# Patient Record
Sex: FEMALE | Race: WHITE | NOT HISPANIC OR LATINO | Employment: UNEMPLOYED | RURAL
[De-identification: names, ages, dates, MRNs, and addresses within clinical notes are randomized per-mention and may not be internally consistent; named-entity substitution may affect disease eponyms.]

---

## 2021-03-22 ENCOUNTER — HOSPITAL ENCOUNTER (EMERGENCY)
Facility: HOSPITAL | Age: 21
Discharge: HOME | End: 2021-03-22
Payer: MEDICAID

## 2021-03-22 VITALS
HEIGHT: 69 IN | OXYGEN SATURATION: 99 % | SYSTOLIC BLOOD PRESSURE: 124 MMHG | TEMPERATURE: 98 F | BODY MASS INDEX: 23.4 KG/M2 | DIASTOLIC BLOOD PRESSURE: 74 MMHG | WEIGHT: 158 LBS | HEART RATE: 88 BPM | RESPIRATION RATE: 18 BRPM

## 2021-03-22 DIAGNOSIS — O21.0 HYPEREMESIS GRAVIDARUM: Primary | ICD-10-CM

## 2021-03-22 DIAGNOSIS — N39.0 URINARY TRACT INFECTION WITHOUT HEMATURIA, SITE UNSPECIFIED: ICD-10-CM

## 2021-03-22 LAB
ANION GAP SERPL CALCULATED.3IONS-SCNC: 16 MMOL/L
BASOPHILS # BLD AUTO: 0.05 X10E3/UL (ref 0–0.2)
BASOPHILS NFR BLD AUTO: 0.6 % (ref 0–1)
BILIRUB UR QL STRIP: ABNORMAL MG/DL
BUN SERPL-MCNC: 10 MG/DL (ref 7–18)
CALCIUM SERPL-MCNC: 9.6 MG/DL (ref 8.5–10.1)
CHLORIDE SERPL-SCNC: 102 MMOL/L (ref 98–107)
CLARITY UR: CLEAR
CO2 SERPL-SCNC: 23 MMOL/L (ref 21–32)
COLOR UR: YELLOW
CREAT SERPL-MCNC: 0.72 MG/DL (ref 0.55–1.02)
EOSINOPHIL # BLD AUTO: 0.02 X10E3/UL (ref 0–0.5)
EOSINOPHIL NFR BLD AUTO: 0.2 % (ref 1–4)
ERYTHROCYTE [DISTWIDTH] IN BLOOD BY AUTOMATED COUNT: 11.7 % (ref 11.5–14.5)
GLUCOSE SERPL-MCNC: 101 MG/DL (ref 74–106)
GLUCOSE UR STRIP-MCNC: NEGATIVE MG/DL
HCG SERPL-ACNC: NORMAL MIU/ML
HCT VFR BLD AUTO: 42.5 % (ref 38–47)
HGB BLD-MCNC: 15 G/DL (ref 12–16)
IMM GRANULOCYTES # BLD AUTO: 0.02 X10E3/UL (ref 0–0.04)
IMM GRANULOCYTES NFR BLD: 0.2 % (ref 0–0.4)
KETONES UR STRIP-SCNC: >=80 MG/DL
LEUKOCYTE ESTERASE UR QL STRIP: ABNORMAL LEU/UL
LYMPHOCYTES # BLD AUTO: 1.27 X10E3/UL (ref 1–4.8)
LYMPHOCYTES NFR BLD AUTO: 15.8 % (ref 27–41)
MCH RBC QN AUTO: 30.1 PG (ref 27–31)
MCHC RBC AUTO-ENTMCNC: 35.3 G/DL (ref 32–36)
MCV RBC AUTO: 85.2 FL (ref 80–96)
MONOCYTES # BLD AUTO: 0.33 X10E3/UL (ref 0–0.8)
MONOCYTES NFR BLD AUTO: 4.1 % (ref 2–6)
MPC BLD CALC-MCNC: 9.8 FL (ref 9.4–12.4)
NEUTROPHILS # BLD AUTO: 6.33 X10E3/UL (ref 1.8–7.7)
NEUTROPHILS NFR BLD AUTO: 79.1 % (ref 53–65)
NITRITE UR QL STRIP: NEGATIVE
NRBC # BLD AUTO: 0 X10E3/UL (ref 0–0)
NRBC, AUTO (.00): 0 /100 (ref 0–0)
PH UR STRIP: 6 PH UNITS (ref 5–8)
PLATELET # BLD AUTO: 244 X10E3/UL (ref 150–400)
POTASSIUM SERPL-SCNC: 3.9 MMOL/L (ref 3.5–5.1)
PROT UR QL STRIP: ABNORMAL MG/DL
RBC # BLD AUTO: 4.99 X10E6/UL (ref 4.2–5.4)
RBC # UR STRIP: NEGATIVE ERY/UL
SODIUM SERPL-SCNC: 137 MMOL/L (ref 136–145)
SP GR UR STRIP: 1.02 (ref 1–1.03)
UROBILINOGEN UR STRIP-ACNC: 1 EU/DL
WBC # BLD AUTO: 8.02 X10E3/UL (ref 4.5–11)

## 2021-03-22 PROCEDURE — 96365 THER/PROPH/DIAG IV INF INIT: CPT

## 2021-03-22 PROCEDURE — 63600175 PHARM REV CODE 636 W HCPCS: Performed by: NURSE PRACTITIONER

## 2021-03-22 PROCEDURE — 81003 URINALYSIS AUTO W/O SCOPE: CPT

## 2021-03-22 PROCEDURE — 25000003 PHARM REV CODE 250: Performed by: NURSE PRACTITIONER

## 2021-03-22 PROCEDURE — 96361 HYDRATE IV INFUSION ADD-ON: CPT

## 2021-03-22 PROCEDURE — 99283 EMERGENCY DEPT VISIT LOW MDM: CPT | Mod: ,,, | Performed by: NURSE PRACTITIONER

## 2021-03-22 PROCEDURE — 36415 COLL VENOUS BLD VENIPUNCTURE: CPT

## 2021-03-22 PROCEDURE — 85025 COMPLETE CBC W/AUTO DIFF WBC: CPT

## 2021-03-22 PROCEDURE — 80048 BASIC METABOLIC PNL TOTAL CA: CPT

## 2021-03-22 PROCEDURE — 99283 PR EMERGENCY DEPT VISIT,LEVEL III: ICD-10-PCS | Mod: ,,, | Performed by: NURSE PRACTITIONER

## 2021-03-22 PROCEDURE — 99284 EMERGENCY DEPT VISIT MOD MDM: CPT | Mod: 25

## 2021-03-22 RX ORDER — FLUVOXAMINE MALEATE 150 MG/1
CAPSULE, EXTENDED RELEASE ORAL
COMMUNITY
End: 2022-03-18 | Stop reason: ALTCHOICE

## 2021-03-22 RX ORDER — ONDANSETRON 4 MG/1
4 TABLET, FILM COATED ORAL EVERY 8 HOURS PRN
Qty: 12 TABLET | Refills: 0 | OUTPATIENT
Start: 2021-03-22 | End: 2021-05-08

## 2021-03-22 RX ORDER — NITROFURANTOIN 25; 75 MG/1; MG/1
100 CAPSULE ORAL 2 TIMES DAILY
Qty: 10 CAPSULE | Refills: 0 | Status: SHIPPED | OUTPATIENT
Start: 2021-03-22 | End: 2021-03-22

## 2021-03-22 RX ADMIN — SODIUM CHLORIDE 1000 ML: 9 INJECTION, SOLUTION INTRAVENOUS at 01:03

## 2021-03-22 RX ADMIN — PROMETHAZINE HYDROCHLORIDE 12.5 MG: 25 INJECTION INTRAMUSCULAR; INTRAVENOUS at 01:03

## 2021-05-07 ENCOUNTER — HOSPITAL ENCOUNTER (EMERGENCY)
Facility: HOSPITAL | Age: 21
Discharge: HOME | End: 2021-05-08
Attending: STUDENT IN AN ORGANIZED HEALTH CARE EDUCATION/TRAINING PROGRAM
Payer: MEDICAID

## 2021-05-07 VITALS
DIASTOLIC BLOOD PRESSURE: 69 MMHG | HEIGHT: 68 IN | HEART RATE: 90 BPM | TEMPERATURE: 98 F | WEIGHT: 149 LBS | BODY MASS INDEX: 22.58 KG/M2 | OXYGEN SATURATION: 100 % | SYSTOLIC BLOOD PRESSURE: 108 MMHG | RESPIRATION RATE: 16 BRPM

## 2021-05-07 DIAGNOSIS — R11.10 VOMITING, INTRACTABILITY OF VOMITING NOT SPECIFIED, PRESENCE OF NAUSEA NOT SPECIFIED, UNSPECIFIED VOMITING TYPE: Primary | ICD-10-CM

## 2021-05-07 LAB
ANION GAP SERPL CALCULATED.3IONS-SCNC: 16 MMOL/L (ref 7–16)
BUN SERPL-MCNC: 6 MG/DL (ref 7–18)
BUN/CREAT SERPL: 9 (ref 6–20)
CALCIUM SERPL-MCNC: 9 MG/DL (ref 8.5–10.1)
CHLORIDE SERPL-SCNC: 103 MMOL/L (ref 98–107)
CO2 SERPL-SCNC: 26 MMOL/L (ref 21–32)
CREAT SERPL-MCNC: 0.68 MG/DL (ref 0.55–1.02)
GLUCOSE SERPL-MCNC: 87 MG/DL (ref 74–106)
POTASSIUM SERPL-SCNC: 3.8 MMOL/L (ref 3.5–5.1)
SODIUM SERPL-SCNC: 141 MMOL/L (ref 136–145)

## 2021-05-07 PROCEDURE — 81003 URINALYSIS AUTO W/O SCOPE: CPT | Performed by: STUDENT IN AN ORGANIZED HEALTH CARE EDUCATION/TRAINING PROGRAM

## 2021-05-07 PROCEDURE — 99283 PR EMERGENCY DEPT VISIT,LEVEL III: ICD-10-PCS | Mod: ,,, | Performed by: FAMILY MEDICINE

## 2021-05-07 PROCEDURE — 99284 EMERGENCY DEPT VISIT MOD MDM: CPT | Mod: 25

## 2021-05-07 PROCEDURE — 96361 HYDRATE IV INFUSION ADD-ON: CPT

## 2021-05-07 PROCEDURE — 63600175 PHARM REV CODE 636 W HCPCS: Performed by: STUDENT IN AN ORGANIZED HEALTH CARE EDUCATION/TRAINING PROGRAM

## 2021-05-07 PROCEDURE — 96365 THER/PROPH/DIAG IV INF INIT: CPT

## 2021-05-07 PROCEDURE — 25000003 PHARM REV CODE 250: Performed by: STUDENT IN AN ORGANIZED HEALTH CARE EDUCATION/TRAINING PROGRAM

## 2021-05-07 PROCEDURE — 85025 COMPLETE CBC W/AUTO DIFF WBC: CPT | Performed by: STUDENT IN AN ORGANIZED HEALTH CARE EDUCATION/TRAINING PROGRAM

## 2021-05-07 PROCEDURE — 80048 BASIC METABOLIC PNL TOTAL CA: CPT | Performed by: STUDENT IN AN ORGANIZED HEALTH CARE EDUCATION/TRAINING PROGRAM

## 2021-05-07 PROCEDURE — 81001 URINALYSIS AUTO W/SCOPE: CPT | Performed by: STUDENT IN AN ORGANIZED HEALTH CARE EDUCATION/TRAINING PROGRAM

## 2021-05-07 PROCEDURE — 36415 COLL VENOUS BLD VENIPUNCTURE: CPT | Performed by: STUDENT IN AN ORGANIZED HEALTH CARE EDUCATION/TRAINING PROGRAM

## 2021-05-07 PROCEDURE — 99283 EMERGENCY DEPT VISIT LOW MDM: CPT | Mod: ,,, | Performed by: FAMILY MEDICINE

## 2021-05-07 RX ADMIN — PROMETHAZINE HYDROCHLORIDE 25 MG: 25 INJECTION INTRAMUSCULAR; INTRAVENOUS at 11:05

## 2021-05-07 RX ADMIN — SODIUM CHLORIDE 1000 ML: 9 INJECTION, SOLUTION INTRAVENOUS at 11:05

## 2021-05-08 LAB
BACTERIA #/AREA URNS HPF: ABNORMAL /HPF
BASOPHILS # BLD AUTO: 0.04 K/UL (ref 0–0.2)
BASOPHILS NFR BLD AUTO: 0.5 % (ref 0–1)
BILIRUB UR QL STRIP: NEGATIVE
CLARITY UR: CLEAR
COLOR UR: YELLOW
DIFFERENTIAL METHOD BLD: ABNORMAL
EOSINOPHIL # BLD AUTO: 0.07 K/UL (ref 0–0.5)
EOSINOPHIL NFR BLD AUTO: 0.8 % (ref 1–4)
ERYTHROCYTE [DISTWIDTH] IN BLOOD BY AUTOMATED COUNT: 12 % (ref 11.5–14.5)
GLUCOSE UR STRIP-MCNC: NEGATIVE MG/DL
HCT VFR BLD AUTO: 36 % (ref 38–47)
HGB BLD-MCNC: 12.7 G/DL (ref 12–16)
IMM GRANULOCYTES # BLD AUTO: 0.02 K/UL (ref 0–0.04)
IMM GRANULOCYTES NFR BLD: 0.2 % (ref 0–0.4)
KETONES UR STRIP-SCNC: >=80 MG/DL
LEUKOCYTE ESTERASE UR QL STRIP: ABNORMAL
LYMPHOCYTES # BLD AUTO: 1.63 K/UL (ref 1–4.8)
LYMPHOCYTES NFR BLD AUTO: 19.2 % (ref 27–41)
MCH RBC QN AUTO: 31 PG (ref 27–31)
MCHC RBC AUTO-ENTMCNC: 35.3 G/DL (ref 32–36)
MCV RBC AUTO: 87.8 FL (ref 80–96)
MONOCYTES # BLD AUTO: 0.41 K/UL (ref 0–0.8)
MONOCYTES NFR BLD AUTO: 4.8 % (ref 2–6)
MPC BLD CALC-MCNC: 10.3 FL (ref 9.4–12.4)
MUCOUS THREADS #/AREA URNS HPF: ABNORMAL /HPF
NEUTROPHILS # BLD AUTO: 6.3 K/UL (ref 1.8–7.7)
NEUTROPHILS NFR BLD AUTO: 74.5 % (ref 53–65)
NITRITE UR QL STRIP: NEGATIVE
NRBC # BLD AUTO: 0 X10E3/UL
NRBC, AUTO (.00): 0 %
PH UR STRIP: 6 PH UNITS
PLATELET # BLD AUTO: 225 K/UL (ref 150–400)
PROT UR QL STRIP: 30
RBC # BLD AUTO: 4.1 M/UL (ref 4.2–5.4)
RBC # UR STRIP: NEGATIVE /UL
RBC #/AREA URNS HPF: ABNORMAL /HPF
SP GR UR STRIP: >=1.03
SQUAMOUS #/AREA URNS LPF: ABNORMAL /LPF
TRICHOMONAS #/AREA URNS HPF: ABNORMAL /HPF
UROBILINOGEN UR STRIP-ACNC: 1 MG/DL
WBC # BLD AUTO: 8.47 K/UL (ref 4.5–11)
WBC #/AREA URNS HPF: ABNORMAL /HPF
YEAST #/AREA URNS HPF: ABNORMAL /HPF

## 2021-05-08 RX ORDER — ONDANSETRON 4 MG/1
4 TABLET, ORALLY DISINTEGRATING ORAL EVERY 6 HOURS PRN
Qty: 15 TABLET | Refills: 0 | Status: SHIPPED | OUTPATIENT
Start: 2021-05-08 | End: 2022-03-18

## 2021-05-31 ENCOUNTER — OFFICE VISIT (OUTPATIENT)
Dept: FAMILY MEDICINE | Facility: CLINIC | Age: 21
End: 2021-05-31
Payer: MEDICAID

## 2021-05-31 VITALS
BODY MASS INDEX: 23.79 KG/M2 | HEART RATE: 91 BPM | RESPIRATION RATE: 20 BRPM | OXYGEN SATURATION: 99 % | DIASTOLIC BLOOD PRESSURE: 65 MMHG | SYSTOLIC BLOOD PRESSURE: 99 MMHG | TEMPERATURE: 100 F | HEIGHT: 68 IN | WEIGHT: 157 LBS

## 2021-05-31 DIAGNOSIS — R05.9 COUGH: ICD-10-CM

## 2021-05-31 DIAGNOSIS — R50.9 FEVER, UNSPECIFIED FEVER CAUSE: ICD-10-CM

## 2021-05-31 DIAGNOSIS — J02.9 SORE THROAT: Primary | ICD-10-CM

## 2021-05-31 LAB
CTP QC/QA: YES
CTP QC/QA: YES
FLUAV AG NPH QL: NEGATIVE
FLUBV AG NPH QL: NEGATIVE
S PYO RRNA THROAT QL PROBE: NEGATIVE
SARS-COV-2 AG RESP QL IA.RAPID: NEGATIVE

## 2021-05-31 PROCEDURE — 87880 STREP A ASSAY W/OPTIC: CPT | Mod: RHCUB | Performed by: NURSE PRACTITIONER

## 2021-05-31 PROCEDURE — 99213 OFFICE O/P EST LOW 20 MIN: CPT | Mod: ,,, | Performed by: NURSE PRACTITIONER

## 2021-05-31 PROCEDURE — 87428 SARSCOV & INF VIR A&B AG IA: CPT | Mod: RHCUB | Performed by: NURSE PRACTITIONER

## 2021-05-31 PROCEDURE — 99213 PR OFFICE/OUTPT VISIT, EST, LEVL III, 20-29 MIN: ICD-10-PCS | Mod: ,,, | Performed by: NURSE PRACTITIONER

## 2022-03-18 ENCOUNTER — OFFICE VISIT (OUTPATIENT)
Dept: FAMILY MEDICINE | Facility: CLINIC | Age: 22
End: 2022-03-18
Payer: MEDICAID

## 2022-03-18 VITALS
DIASTOLIC BLOOD PRESSURE: 72 MMHG | BODY MASS INDEX: 25.16 KG/M2 | HEART RATE: 116 BPM | TEMPERATURE: 100 F | WEIGHT: 166 LBS | HEIGHT: 68 IN | SYSTOLIC BLOOD PRESSURE: 118 MMHG | OXYGEN SATURATION: 99 % | RESPIRATION RATE: 16 BRPM

## 2022-03-18 DIAGNOSIS — R52 BODY ACHES: ICD-10-CM

## 2022-03-18 DIAGNOSIS — R39.15 URINARY URGENCY: ICD-10-CM

## 2022-03-18 DIAGNOSIS — R68.83 CHILLS: Primary | ICD-10-CM

## 2022-03-18 DIAGNOSIS — R19.7 DIARRHEA, UNSPECIFIED TYPE: ICD-10-CM

## 2022-03-18 LAB
BILIRUB SERPL-MCNC: NORMAL MG/DL
BLOOD URINE, POC: NORMAL
COLOR, POC UA: YELLOW
CTP QC/QA: YES
FLUAV AG NPH QL: NEGATIVE
FLUBV AG NPH QL: NEGATIVE
GLUCOSE UR QL STRIP: NORMAL
KETONES UR QL STRIP: NORMAL
LEUKOCYTE ESTERASE URINE, POC: NORMAL
NITRITE, POC UA: NORMAL
PH, POC UA: 5.5
PROTEIN, POC: NORMAL
SARS-COV-2 AG RESP QL IA.RAPID: NEGATIVE
SPECIFIC GRAVITY, POC UA: 1.02
UROBILINOGEN, POC UA: 0.2

## 2022-03-18 PROCEDURE — 99214 PR OFFICE/OUTPT VISIT, EST, LEVL IV, 30-39 MIN: ICD-10-PCS | Mod: ,,, | Performed by: NURSE PRACTITIONER

## 2022-03-18 PROCEDURE — 99214 OFFICE O/P EST MOD 30 MIN: CPT | Mod: ,,, | Performed by: NURSE PRACTITIONER

## 2022-03-18 PROCEDURE — 3008F BODY MASS INDEX DOCD: CPT | Mod: CPTII,,, | Performed by: NURSE PRACTITIONER

## 2022-03-18 PROCEDURE — 3008F PR BODY MASS INDEX (BMI) DOCUMENTED: ICD-10-PCS | Mod: CPTII,,, | Performed by: NURSE PRACTITIONER

## 2022-03-18 PROCEDURE — 81003 URINALYSIS AUTO W/O SCOPE: CPT | Mod: RHCUB | Performed by: NURSE PRACTITIONER

## 2022-03-18 PROCEDURE — 3074F SYST BP LT 130 MM HG: CPT | Mod: CPTII,,, | Performed by: NURSE PRACTITIONER

## 2022-03-18 PROCEDURE — 3078F DIAST BP <80 MM HG: CPT | Mod: CPTII,,, | Performed by: NURSE PRACTITIONER

## 2022-03-18 PROCEDURE — 87086 URINE CULTURE/COLONY COUNT: CPT | Mod: ,,, | Performed by: CLINICAL MEDICAL LABORATORY

## 2022-03-18 PROCEDURE — 3078F PR MOST RECENT DIASTOLIC BLOOD PRESSURE < 80 MM HG: ICD-10-PCS | Mod: CPTII,,, | Performed by: NURSE PRACTITIONER

## 2022-03-18 PROCEDURE — 1159F PR MEDICATION LIST DOCUMENTED IN MEDICAL RECORD: ICD-10-PCS | Mod: CPTII,,, | Performed by: NURSE PRACTITIONER

## 2022-03-18 PROCEDURE — 3074F PR MOST RECENT SYSTOLIC BLOOD PRESSURE < 130 MM HG: ICD-10-PCS | Mod: CPTII,,, | Performed by: NURSE PRACTITIONER

## 2022-03-18 PROCEDURE — 1159F MED LIST DOCD IN RCRD: CPT | Mod: CPTII,,, | Performed by: NURSE PRACTITIONER

## 2022-03-18 PROCEDURE — 87428 SARSCOV & INF VIR A&B AG IA: CPT | Mod: RHCUB | Performed by: NURSE PRACTITIONER

## 2022-03-18 PROCEDURE — 87086 CULTURE, URINE: ICD-10-PCS | Mod: ,,, | Performed by: CLINICAL MEDICAL LABORATORY

## 2022-03-18 RX ORDER — FLUVOXAMINE MALEATE 100 MG/1
200 TABLET, COATED ORAL NIGHTLY
COMMUNITY

## 2022-03-18 RX ORDER — NITROFURANTOIN 25; 75 MG/1; MG/1
100 CAPSULE ORAL 2 TIMES DAILY
Qty: 14 CAPSULE | Refills: 0 | Status: SHIPPED | OUTPATIENT
Start: 2022-03-18

## 2022-03-18 RX ORDER — DEXTROAMPHETAMINE SACCHARATE, AMPHETAMINE ASPARTATE, DEXTROAMPHETAMINE SULFATE AND AMPHETAMINE SULFATE 2.5; 2.5; 2.5; 2.5 MG/1; MG/1; MG/1; MG/1
1 TABLET ORAL 2 TIMES DAILY
COMMUNITY
Start: 2022-02-16

## 2022-03-18 RX ORDER — HYDROXYZINE PAMOATE 25 MG/1
CAPSULE ORAL
COMMUNITY
Start: 2022-02-16

## 2022-03-18 NOTE — PROGRESS NOTES
[facility] - FAMILY MEDICINE  Phone: [phone]       PATIENT NAME: Bettye Jacobsen   : 2000    AGE: 21 y.o. DATE: 2022    MRN: [id number]        Reason for Visit / Chief Complaint:  Generalized Body Aches (Presents with body aches, nausea, diarrhea, and chills that started yesterday. )     Subjective:     HPI:  21 yr old WF presents to the office for body aches, n/v and chills since yesterday  Denies any known exposure to covid or flu   Reports some urinary urgency - concerned for UTI     Review of Systems:    Pertinent items are above noted in HPI.    Review of patient's allergies indicates:   Allergen Reactions    Latex         Med List:  Current Outpatient Medications on File Prior to Visit   Medication Sig Dispense Refill    dextroamphetamine-amphetamine 10 mg Tab Take 1 tablet by mouth 2 (two) times daily.      fluvoxaMINE (LUVOX) 100 MG tablet Take 200 mg by mouth every evening.      VISTARIL 25 mg Cap 1 po tid prn anxiety      [DISCONTINUED] fluvoxaMINE (LUVOX) 150 mg 24 hr capsule Take by mouth.      [DISCONTINUED] ondansetron (ZOFRAN-ODT) 4 MG TbDL Take 1 tablet (4 mg total) by mouth every 6 (six) hours as needed (Nausea/Vomiting). (Patient not taking: Reported on 3/18/2022) 15 tablet 0     No current facility-administered medications on file prior to visit.       Medical/Social/Family History:  Past Medical History:   Diagnosis Date    ADHD (attention deficit hyperactivity disorder)     OCD (obsessive compulsive disorder)       Social History     Tobacco Use   Smoking Status Never Smoker   Smokeless Tobacco Never Used      Social History     Substance and Sexual Activity   Alcohol Use Not Currently       Family History   Problem Relation Age of Onset    Cancer Maternal Grandfather       History reviewed. No pertinent surgical history.     Objective:      Vitals:    22 1431   BP: 118/72   BP Location: Right arm   Patient Position: Sitting  "  BP Method: Medium (Automatic)   Pulse: (!) 116   Resp: 16   Temp: 99.6 °F (37.6 °C)   TempSrc: Oral   SpO2: 99%   Weight: 75.3 kg (166 lb)   Height: 5' 8" (1.727 m)     Body mass index is 25.24 kg/m².     Physical Exam:    General: well developed, well nourished    Head: normocephalic, atraumatic    Eyes: conjunctivae/corneas clear. PERRL.    Mouth/ENT: ENT exam normal, no neck nodes or sinus tendernessmucous membranes moist, pharynx normal without lesions Uvula is midline.     Neck: supple, symmetrical, trachea midline, no adenopathy and thyroid: not enlarged, symmetric, no tenderness/mass/nodules    Respiratory: unlabored respirations, no intercostal retractions or accessory muscle use, clear to auscultation without rales or wheezes    Cardiovascular: normal rate and regular rhythm, S1 and S2 normal, no murmurs noted    Abdomen: soft, nontender    Musculoskeletal: negative; full range of motion, no tenderness, palpable spasm or pain on motion    Lymphadenopathy: No cervical or supraclavicular adenopathy    Neurological: normal without focal findings and mental status, speech normal, alert and oriented x3    Skin: Skin color, texture, turgor normal. No rashes or lesions    Psych: no auditory or visual hallucinations, no anxiety, no depression/worrying alot       Assessment:          ICD-10-CM ICD-9-CM   1. Chills  R68.83 780.64   2. Body aches  R52 780.96   3. Diarrhea, unspecified type  R19.7 787.91   4. Urinary urgency  R39.15 788.63        Plan:       Chills  -     POCT SARS-COV2 (COVID) with Flu Antigen    Body aches  -     POCT SARS-COV2 (COVID) with Flu Antigen    Diarrhea, unspecified type  -     POCT SARS-COV2 (COVID) with Flu Antigen    Urinary urgency  -     POCT URINALYSIS W/O SCOPE  -     Urine culture; Future; Expected date: 03/18/2022  -     nitrofurantoin, macrocrystal-monohydrate, (MACROBID) 100 MG capsule; Take 1 capsule (100 mg total) by mouth 2 (two) times daily.  Dispense: 14 capsule; Refill: " 0        Current Outpatient Medications:     dextroamphetamine-amphetamine 10 mg Tab, Take 1 tablet by mouth 2 (two) times daily., Disp: , Rfl:     fluvoxaMINE (LUVOX) 100 MG tablet, Take 200 mg by mouth every evening., Disp: , Rfl:     VISTARIL 25 mg Cap, 1 po tid prn anxiety, Disp: , Rfl:     nitrofurantoin, macrocrystal-monohydrate, (MACROBID) 100 MG capsule, Take 1 capsule (100 mg total) by mouth 2 (two) times daily., Disp: 14 capsule, Rfl: 0        New & refilled meds:  Requested Prescriptions     Signed Prescriptions Disp Refills    nitrofurantoin, macrocrystal-monohydrate, (MACROBID) 100 MG capsule 14 capsule 0     Sig: Take 1 capsule (100 mg total) by mouth 2 (two) times daily.     Treatment Recommendations:    Orders and follow up as documented in patient record.  Rest, hydrate, light meals - avoid high sugar drinks - can make diarrhea worse   Return to clinic  as needed.    Signature: MATI Reddy

## 2022-03-20 LAB — UA COMPLETE W REFLEX CULTURE PNL UR: NORMAL

## 2024-05-08 ENCOUNTER — OFFICE VISIT (OUTPATIENT)
Dept: OBSTETRICS AND GYNECOLOGY | Facility: CLINIC | Age: 24
End: 2024-05-08
Payer: COMMERCIAL

## 2024-05-08 VITALS
WEIGHT: 167 LBS | OXYGEN SATURATION: 100 % | RESPIRATION RATE: 19 BRPM | SYSTOLIC BLOOD PRESSURE: 110 MMHG | HEART RATE: 70 BPM | HEIGHT: 68 IN | DIASTOLIC BLOOD PRESSURE: 70 MMHG | BODY MASS INDEX: 25.31 KG/M2

## 2024-05-08 DIAGNOSIS — Z11.3 SCREENING EXAMINATION FOR STD (SEXUALLY TRANSMITTED DISEASE): ICD-10-CM

## 2024-05-08 DIAGNOSIS — Z97.5 NEXPLANON IN PLACE: ICD-10-CM

## 2024-05-08 DIAGNOSIS — N63.24 MASS OF LOWER INNER QUADRANT OF LEFT BREAST: Primary | ICD-10-CM

## 2024-05-08 DIAGNOSIS — Z86.39 HISTORY OF THYROID NODULE: ICD-10-CM

## 2024-05-08 DIAGNOSIS — Z12.4 SCREENING FOR CERVICAL CANCER: ICD-10-CM

## 2024-05-08 PROCEDURE — 3074F SYST BP LT 130 MM HG: CPT | Mod: CPTII,,, | Performed by: ADVANCED PRACTICE MIDWIFE

## 2024-05-08 PROCEDURE — 87491 CHLMYD TRACH DNA AMP PROBE: CPT | Mod: ,,, | Performed by: CLINICAL MEDICAL LABORATORY

## 2024-05-08 PROCEDURE — 99203 OFFICE O/P NEW LOW 30 MIN: CPT | Mod: S$PBB,,, | Performed by: ADVANCED PRACTICE MIDWIFE

## 2024-05-08 PROCEDURE — 88142 CYTOPATH C/V THIN LAYER: CPT | Mod: TC,GCY | Performed by: ADVANCED PRACTICE MIDWIFE

## 2024-05-08 PROCEDURE — 87661 TRICHOMONAS VAGINALIS AMPLIF: CPT | Mod: ,,, | Performed by: CLINICAL MEDICAL LABORATORY

## 2024-05-08 PROCEDURE — 1159F MED LIST DOCD IN RCRD: CPT | Mod: CPTII,,, | Performed by: ADVANCED PRACTICE MIDWIFE

## 2024-05-08 PROCEDURE — 3008F BODY MASS INDEX DOCD: CPT | Mod: CPTII,,, | Performed by: ADVANCED PRACTICE MIDWIFE

## 2024-05-08 PROCEDURE — 3078F DIAST BP <80 MM HG: CPT | Mod: CPTII,,, | Performed by: ADVANCED PRACTICE MIDWIFE

## 2024-05-08 PROCEDURE — 99214 OFFICE O/P EST MOD 30 MIN: CPT | Mod: PBBFAC | Performed by: ADVANCED PRACTICE MIDWIFE

## 2024-05-08 PROCEDURE — 87591 N.GONORRHOEAE DNA AMP PROB: CPT | Mod: ,,, | Performed by: CLINICAL MEDICAL LABORATORY

## 2024-05-08 RX ORDER — LISDEXAMFETAMINE DIMESYLATE 50 MG/1
50 CAPSULE ORAL
COMMUNITY
Start: 2024-03-30

## 2024-05-08 NOTE — PROGRESS NOTES
Subjective     Patient ID: Bettye Jacobsen is a 23 y.o. female.    Chief Complaint: Gynecologic Exam (In for Annual Exam. Denies any problems at this time. Pts last pap was in  or ./)    Nexplanon for birth control , irregular spotting and bleeding.  Denies any complaints or problems.  Unsure of last pap  Reports possible scar tissue from repair done during vaginal delivery in   Not currently SA  Requesting STD labs  Hx thyroid nodule and has been seen at [facility] for this.  Last visit was approx 6 months ago      Gynecologic Exam  The patient's pertinent negatives include no genital itching, genital lesions, genital odor, genital rash, missed menses, pelvic pain, vaginal bleeding or vaginal discharge. She is not pregnant. Associated symptoms include constipation. Pertinent negatives include no abdominal pain, anorexia, back pain, chills, diarrhea, discolored urine, dysuria, fever, flank pain, frequency, headaches, hematuria, nausea, painful intercourse, rash, urgency or vomiting. The vaginal bleeding is spotting. She has not been passing clots. She has not been passing tissue. She is not sexually active. No, her partner does not have an STD. She uses an IUD for contraception. Her menstrual history has been irregular. Her past medical history is significant for an STD. There is no history of an abdominal surgery, a  section, an ectopic pregnancy, endometriosis, a gynecological surgery, herpes simplex, menorrhagia, metrorrhagia, miscarriage, ovarian cysts, perineal abscess, PID, a terminated pregnancy or vaginosis.     Review of Systems   Constitutional: Negative.  Negative for chills and fever.   HENT: Negative.     Eyes: Negative.    Respiratory: Negative.     Cardiovascular: Negative.    Gastrointestinal:  Positive for constipation. Negative for abdominal pain, anorexia, diarrhea, nausea and vomiting.   Endocrine: Negative.    Genitourinary: Negative.  Positive for menstrual irregularity. Negative for  dysuria, flank pain, frequency, hematuria, menorrhagia, missed menses, pelvic pain, urgency and vaginal discharge.   Musculoskeletal: Negative.  Negative for back pain.   Integumentary:  Negative for rash. Negative.   Allergic/Immunologic: Negative.    Neurological: Negative.  Negative for headaches.   Psychiatric/Behavioral: Negative.            Objective     Physical Exam  Vitals reviewed. Exam conducted with a chaperone present.   Constitutional:       Appearance: Normal appearance.   HENT:      Head: Normocephalic.   Neck:      Thyroid: Thyromegaly (slightly enlarged) present. No thyroid tenderness.   Cardiovascular:      Rate and Rhythm: Normal rate and regular rhythm.   Pulmonary:      Effort: Pulmonary effort is normal.      Breath sounds: Normal breath sounds.   Chest:   Breasts:     Right: Normal. No mass.      Left: Mass present.          Comments: Approx 1cm, smooth mobile, non-tender mass left breast at 9 o'clock location  Abdominal:      General: Abdomen is flat.      Palpations: Abdomen is soft.   Genitourinary:     General: Normal vulva.      Exam position: Lithotomy position.      Vagina: Normal.      Cervix: No cervical motion tenderness.      Uterus: Normal. Not tender.       Adnexa: Right adnexa normal and left adnexa normal.        Right: No mass.          Left: No mass.     Musculoskeletal:         General: Normal range of motion.      Cervical back: Normal range of motion.   Skin:     General: Skin is warm and dry.   Neurological:      Mental Status: She is alert and oriented to person, place, and time.   Psychiatric:         Mood and Affect: Mood normal.         Behavior: Behavior normal.          Assessment and Plan     1. Mass of lower inner quadrant of left breast  -     US Breast Left Complete; Future; Expected date: 05/15/2024    2. Screening for cervical cancer  -     ThinPrep Pap Test    3. Nexplanon in place    4. Screening examination for STD (sexually transmitted disease)  -      Treponema Pallidum (Syphillis) Antibody; Future; Expected date: 05/08/2024  -     Hepatitis B Surface Antigen; Future; Expected date: 05/08/2024  -     HIV 1/2 Ag/Ab (4th Gen); Future; Expected date: 05/08/2024  -     Trichomonas vaginalis by PCR  -     Chlamydia/GC, PCR    5. History of thyroid nodule  -     Thyroid Panel; Future; Expected date: 05/08/2024  -     US Thyroid; Future; Expected date: 05/15/2024        Needs breast ultrasound and thyroid ultrasound  Labs today         Follow up in about 1 year (around 5/8/2025), or if symptoms worsen or fail to improve, for Annual Exam.

## 2024-05-09 ENCOUNTER — TELEPHONE (OUTPATIENT)
Dept: OBSTETRICS AND GYNECOLOGY | Facility: CLINIC | Age: 24
End: 2024-05-09
Payer: COMMERCIAL

## 2024-05-09 LAB
CHLAMYDIA BY PCR: NEGATIVE
N. GONORRHOEAE (GC) BY PCR: NEGATIVE
TRICHOMONAS NAT: NEGATIVE

## 2024-05-09 NOTE — TELEPHONE ENCOUNTER
----- Message from Lisandra You CNM sent at 5/8/2024  1:10 PM CDT -----  Regarding: Breast and Thyroid Ultrasound  Pt needs left breast ultrasound and thyroid ultrasound scheduled.  Try to do on same day if possible. Call pt with date/time of appt

## 2024-05-13 LAB
GH SERPL-MCNC: NORMAL NG/ML
INSULIN SERPL-ACNC: NORMAL U[IU]/ML
LAB AP CLINICAL INFORMATION: NORMAL
LAB AP GYN INTERPRETATION: NEGATIVE
LAB AP PAP DISCLAIMER COMMENTS: NORMAL
RENIN PLAS-CCNC: NORMAL NG/ML/H

## 2024-05-16 ENCOUNTER — HOSPITAL ENCOUNTER (OUTPATIENT)
Dept: RADIOLOGY | Facility: HOSPITAL | Age: 24
Discharge: HOME | End: 2024-05-16
Attending: ADVANCED PRACTICE MIDWIFE
Payer: COMMERCIAL

## 2024-05-16 ENCOUNTER — HOSPITAL ENCOUNTER (OUTPATIENT)
Dept: RADIOLOGY | Facility: HOSPITAL | Age: 24
Discharge: HOME | End: 2024-05-16
Attending: STUDENT IN AN ORGANIZED HEALTH CARE EDUCATION/TRAINING PROGRAM
Payer: COMMERCIAL

## 2024-05-16 VITALS — WEIGHT: 165 LBS | HEIGHT: 68 IN | BODY MASS INDEX: 25.01 KG/M2

## 2024-05-16 DIAGNOSIS — N63.20 MASS OF LEFT BREAST: Primary | ICD-10-CM

## 2024-05-16 DIAGNOSIS — N63.20 LEFT BREAST MASS: ICD-10-CM

## 2024-05-16 DIAGNOSIS — Z86.39 HISTORY OF THYROID NODULE: ICD-10-CM

## 2024-05-16 DIAGNOSIS — N63.24 MASS OF LOWER INNER QUADRANT OF LEFT BREAST: ICD-10-CM

## 2024-05-16 PROCEDURE — 88305 TISSUE EXAM BY PATHOLOGIST: CPT | Mod: 26,,, | Performed by: PATHOLOGY

## 2024-05-16 PROCEDURE — 27000542 HC HANDPIECE BREAST BIOPSY, ANY

## 2024-05-16 PROCEDURE — 19083 BX BREAST 1ST LESION US IMAG: CPT | Mod: LT

## 2024-05-16 PROCEDURE — 76536 US EXAM OF HEAD AND NECK: CPT | Mod: 26,,, | Performed by: RADIOLOGY

## 2024-05-16 PROCEDURE — 76536 US EXAM OF HEAD AND NECK: CPT | Mod: TC

## 2024-05-16 PROCEDURE — 27202044

## 2024-05-16 PROCEDURE — A4648 IMPLANTABLE TISSUE MARKER: HCPCS

## 2024-05-16 PROCEDURE — 88305 TISSUE EXAM BY PATHOLOGIST: CPT | Mod: TC,SUR | Performed by: STUDENT IN AN ORGANIZED HEALTH CARE EDUCATION/TRAINING PROGRAM

## 2024-05-16 PROCEDURE — 76642 ULTRASOUND BREAST LIMITED: CPT | Mod: TC,LT

## 2024-05-16 PROCEDURE — 27200940 HC BIOPSY TRAY

## 2024-05-17 ENCOUNTER — PATIENT MESSAGE (OUTPATIENT)
Dept: OBSTETRICS AND GYNECOLOGY | Facility: CLINIC | Age: 24
End: 2024-05-17
Payer: COMMERCIAL

## 2024-05-17 LAB
ESTROGEN SERPL-MCNC: NORMAL PG/ML
INSULIN SERPL-ACNC: NORMAL U[IU]/ML
LAB AP CLINICAL INFORMATION: NORMAL
LAB AP GROSS DESCRIPTION: NORMAL
LAB AP LABORATORY NOTES: NORMAL
T3RU NFR SERPL: NORMAL %

## 2024-05-29 ENCOUNTER — PATIENT MESSAGE (OUTPATIENT)
Dept: OBSTETRICS AND GYNECOLOGY | Facility: CLINIC | Age: 24
End: 2024-05-29
Payer: COMMERCIAL

## 2024-05-30 DIAGNOSIS — E04.1 THYROID NODULE: Primary | ICD-10-CM

## 2024-06-18 ENCOUNTER — TELEPHONE (OUTPATIENT)
Dept: OBSTETRICS AND GYNECOLOGY | Facility: CLINIC | Age: 24
End: 2024-06-18
Payer: COMMERCIAL

## 2024-06-24 ENCOUNTER — OFFICE VISIT (OUTPATIENT)
Dept: SURGERY | Facility: CLINIC | Age: 24
End: 2024-06-24
Attending: SURGERY
Payer: COMMERCIAL

## 2024-06-24 DIAGNOSIS — E04.1 THYROID NODULE: ICD-10-CM

## 2024-06-24 DIAGNOSIS — D24.2 FIBROADENOMA OF LEFT BREAST: Primary | ICD-10-CM

## 2024-06-24 PROCEDURE — 1159F MED LIST DOCD IN RCRD: CPT | Mod: CPTII,,, | Performed by: SURGERY

## 2024-06-24 PROCEDURE — 99999 PR PBB SHADOW E&M-EST. PATIENT-LVL III: CPT | Mod: PBBFAC,,, | Performed by: SURGERY

## 2024-06-24 PROCEDURE — 99213 OFFICE O/P EST LOW 20 MIN: CPT | Mod: PBBFAC | Performed by: SURGERY

## 2024-06-24 PROCEDURE — 99204 OFFICE O/P NEW MOD 45 MIN: CPT | Mod: S$PBB,,, | Performed by: SURGERY

## 2024-06-24 NOTE — PROGRESS NOTES
General Surgery History and Physical      Patient ID: Bettye Jacobsen is a 23 y.o. female.    Chief Complaint: Thyroid Problem      HPI:  23-year-old female who comes in with a thyroid nodule that has been monitored for couple years now.  She has had an endocrinologist in [place] but her insurance has changed.  She is always felt like she is got something in her throat occasional hot flashes she feels like she was losing her hair but the TSH levels were all was relatively normal.  The size is increased slightly from around 1-1.5 cm in size it was read as T rads 4 this year.  No family history of thyroid cancer no previous radiation to her neck.  No surgeries to her neck.  Patient also had a breast nodule recently biopsy came back as a fibroadenoma.  Only about 1.5 cm in size was counseled to watch it.  Agree with that plan.    Review of Systems   Constitutional:  Negative for activity change, appetite change, fatigue and fever.   HENT:  Negative for trouble swallowing.    Respiratory:  Negative for cough and shortness of breath.    Cardiovascular:  Negative for chest pain and palpitations.   Gastrointestinal:  Negative for abdominal distention, abdominal pain, blood in stool, constipation and diarrhea.   Genitourinary:  Negative for flank pain.   Musculoskeletal:  Negative for neck pain and neck stiffness.   Neurological:  Negative for weakness.       Current Outpatient Medications   Medication Sig Dispense Refill    fluvoxaMINE (LUVOX) 100 MG tablet Take 200 mg by mouth every evening.      VYVANSE 50 mg capsule Take 50 mg by mouth.      dextroamphetamine-amphetamine 10 mg Tab Take 1 tablet by mouth 3 (three) times daily. (Patient not taking: Reported on 6/24/2024)       No current facility-administered medications for this visit.       Review of patient's allergies indicates:   Allergen Reactions    Latex        Past Medical  History:   Diagnosis Date    ADHD (attention deficit hyperactivity disorder)     Anxiety     Dyspareunia     OCD (obsessive compulsive disorder)     Thyroid nodule     Has been seen at [facility] in the past for this       Past Surgical History:   Procedure Laterality Date    COLPOSCOPY         Family History   Problem Relation Name Age of Onset    Cancer Maternal Grandfather      Breast cancer Neg Hx      Colon cancer Neg Hx      Ovarian cancer Neg Hx         Social History     Socioeconomic History    Marital status: Single   Tobacco Use    Smoking status: Never    Smokeless tobacco: Never   Substance and Sexual Activity    Alcohol use: Never    Drug use: Never    Sexual activity: Not Currently     Partners: Male     Birth control/protection: Implant       There were no vitals filed for this visit.    Physical Exam  Constitutional:       General: She is not in acute distress.  HENT:      Head: Normocephalic.   Cardiovascular:      Rate and Rhythm: Normal rate and regular rhythm.      Pulses: Normal pulses.   Pulmonary:      Effort: Pulmonary effort is normal. No respiratory distress.      Breath sounds: Normal breath sounds.   Abdominal:      General: Abdomen is flat. There is no distension.      Palpations: Abdomen is soft.      Tenderness: There is no abdominal tenderness.   Musculoskeletal:         General: Normal range of motion.   Skin:     General: Skin is warm.   Neurological:      General: No focal deficit present.      Mental Status: She is oriented to person, place, and time.     Narrative & Impression  EXAMINATION:  US THYROID     CLINICAL HISTORY:  Personal history of other endocrine, nutritional and metabolic diseasehx thyroid nodule;     COMPARISON:  None.     TECHNIQUE:  Multiple longitudinal and transverse real-time sonographic images of the thyroid are obtained.     FINDINGS:  The right lobe of the thyroid measures 5.7 x 1.7 x 1.4 cm. The left lobe of the thyroid measures 5.5 x 1.6 x 1.6 cm.      Heterogeneous mm nodule within the right thyroid lobe which does not meet FNA criteria.  1.49 cm hypoechoic nodule within the lateral left thyroid lobe which is solid.  This is considered a TR4 lesion.  Recommend ultrasound surveillance.  Increased vascularity of the bilateral thyroid which can be seen in the setting of thyroiditis.     Impression:     TR 4 nodule within the left thyroid lobe.  Recommend ultrasound surveillance.  Subcentimeter right-sided thyroid nodule which does not meet FNA criteria.Increased vascularity of the bilateral thyroid which can be seen in the setting of thyroiditis.     Point of Service: [facility]        Electronically signed by:Paco Mckeon  Date:                                            05/16/2024  Time:                                           14:42    Assessment & Plan:    Thyroid nodule  -     Ambulatory referral/consult to General Surgery  -     US FNA Biopsy w/ Imaging 1st Lesion; Future; Expected date: 06/24/2024        Will do an FNA of this thyroid nodule.  Risks and benefits explained.  All questions answered.

## 2024-07-09 ENCOUNTER — OFFICE VISIT (OUTPATIENT)
Dept: SURGERY | Facility: CLINIC | Age: 24
End: 2024-07-09
Attending: SURGERY
Payer: COMMERCIAL

## 2024-07-09 ENCOUNTER — HOSPITAL ENCOUNTER (OUTPATIENT)
Dept: RADIOLOGY | Facility: HOSPITAL | Age: 24
Discharge: HOME | End: 2024-07-09
Attending: SURGERY
Payer: COMMERCIAL

## 2024-07-09 DIAGNOSIS — E04.1 THYROID NODULE: ICD-10-CM

## 2024-07-09 DIAGNOSIS — E04.1 THYROID NODULE: Primary | ICD-10-CM

## 2024-07-09 PROCEDURE — 88305 TISSUE EXAM BY PATHOLOGIST: CPT | Mod: TC,MCY | Performed by: SURGERY

## 2024-07-09 PROCEDURE — 99212 OFFICE O/P EST SF 10 MIN: CPT | Mod: PBBFAC,25 | Performed by: SURGERY

## 2024-07-09 PROCEDURE — 10005 FNA BX W/US GDN 1ST LES: CPT

## 2024-07-09 PROCEDURE — 88173 CYTOPATH EVAL FNA REPORT: CPT | Mod: 26,,, | Performed by: PATHOLOGY

## 2024-07-09 PROCEDURE — 10005 FNA BX W/US GDN 1ST LES: CPT | Mod: S$PBB,,, | Performed by: SURGERY

## 2024-07-09 PROCEDURE — 99499 UNLISTED E&M SERVICE: CPT | Mod: S$PBB,,, | Performed by: SURGERY

## 2024-07-09 PROCEDURE — 10005 FNA BX W/US GDN 1ST LES: CPT | Mod: PBBFAC | Performed by: SURGERY

## 2024-07-09 PROCEDURE — 99999 PR PBB SHADOW E&M-EST. PATIENT-LVL II: CPT | Mod: PBBFAC,,, | Performed by: SURGERY

## 2024-07-09 PROCEDURE — 88305 TISSUE EXAM BY PATHOLOGIST: CPT | Mod: 26,,, | Performed by: PATHOLOGY

## 2024-07-09 NOTE — PROCEDURES
Bettye Jacobsen is a 23 y.o. female patient.     Clinic Fine Needle Aspiration    Surgeon:  Dr. Gualberto Castellano  Pre-operative dx:  FNA of left thyroid nodule  Post-operative Dx: As above  Procedure Performed: FNA of 1.5 cm left thyroid nodule    Anesthesia: None    Complications:  None    Procedure in detail:  After informed consent patient brought to the minor room.  Area was reprepped and draped in usual sterile fashion.  We started off by using a 23 gauge needle and having ultrasound guidance we did 3 passes of the needle to get good specimens of these.  This was then given to pathology.  Area was dressed and patient tolerated the procedure well.    Specimen:  Left thyroid nodule         Procedures    7/9/2024

## 2024-07-10 ENCOUNTER — TELEPHONE (OUTPATIENT)
Dept: SURGERY | Facility: CLINIC | Age: 24
End: 2024-07-10
Payer: COMMERCIAL

## 2024-07-10 DIAGNOSIS — E04.1 THYROID NODULE: Primary | ICD-10-CM

## 2024-07-10 LAB
ESTROGEN SERPL-MCNC: NORMAL PG/ML
INSULIN SERPL-ACNC: NORMAL U[IU]/ML
LAB AP CLINICAL INFORMATION: NORMAL
LAB AP COMMENTS: NORMAL
LAB AP GROSS DESCRIPTION: NORMAL
T3RU NFR SERPL: NORMAL %

## 2024-07-10 NOTE — TELEPHONE ENCOUNTER
Returned pt phone call and informed her to return to clinic in one year for a au/s. Pt denies further questions at this time.

## 2024-07-10 NOTE — TELEPHONE ENCOUNTER
----- Message from Gualberto Castellano MD sent at 7/10/2024 10:36 AM CDT -----  1 year US and f/u appt  ----- Message -----  From: Lab, Background User  Sent: 7/10/2024  10:01 AM CDT  To: Gualberto Castellano MD

## 2024-07-11 ENCOUNTER — PATIENT MESSAGE (OUTPATIENT)
Dept: SURGERY | Facility: CLINIC | Age: 24
End: 2024-07-11
Payer: COMMERCIAL

## 2024-08-07 ENCOUNTER — PATIENT MESSAGE (OUTPATIENT)
Dept: SURGERY | Facility: CLINIC | Age: 24
End: 2024-08-07
Payer: COMMERCIAL

## 2025-01-02 DIAGNOSIS — Z30.46 NEXPLANON REMOVAL: Primary | ICD-10-CM

## 2025-01-15 ENCOUNTER — PROCEDURE VISIT (OUTPATIENT)
Dept: OBSTETRICS AND GYNECOLOGY | Facility: CLINIC | Age: 25
End: 2025-01-15
Payer: COMMERCIAL

## 2025-01-15 VITALS
HEART RATE: 114 BPM | BODY MASS INDEX: 25.01 KG/M2 | DIASTOLIC BLOOD PRESSURE: 62 MMHG | SYSTOLIC BLOOD PRESSURE: 108 MMHG | HEIGHT: 68 IN | WEIGHT: 165 LBS

## 2025-01-15 DIAGNOSIS — Z30.011 ENCOUNTER FOR INITIAL PRESCRIPTION OF CONTRACEPTIVE PILLS: ICD-10-CM

## 2025-01-15 DIAGNOSIS — Z11.3 SCREENING FOR STDS (SEXUALLY TRANSMITTED DISEASES): ICD-10-CM

## 2025-01-15 DIAGNOSIS — Z30.46 NEXPLANON REMOVAL: Primary | ICD-10-CM

## 2025-01-15 LAB
CHLAMYDIA BY PCR: NEGATIVE
N. GONORRHOEAE (GC) BY PCR: NEGATIVE
TRICHOMONAS NAT: NEGATIVE

## 2025-01-15 PROCEDURE — 11982 REMOVE DRUG IMPLANT DEVICE: CPT | Mod: PBBFAC | Performed by: ADVANCED PRACTICE MIDWIFE

## 2025-01-15 PROCEDURE — 87661 TRICHOMONAS VAGINALIS AMPLIF: CPT | Mod: ,,, | Performed by: CLINICAL MEDICAL LABORATORY

## 2025-01-15 PROCEDURE — 87591 N.GONORRHOEAE DNA AMP PROB: CPT | Mod: ,,, | Performed by: CLINICAL MEDICAL LABORATORY

## 2025-01-15 PROCEDURE — 87491 CHLMYD TRACH DNA AMP PROBE: CPT | Mod: ,,, | Performed by: CLINICAL MEDICAL LABORATORY

## 2025-01-15 PROCEDURE — 99213 OFFICE O/P EST LOW 20 MIN: CPT | Mod: S$PBB,25,, | Performed by: ADVANCED PRACTICE MIDWIFE

## 2025-01-15 RX ORDER — DROSPIRENONE AND ETHINYL ESTRADIOL 0.02-3(28)
1 KIT ORAL DAILY
Qty: 30 TABLET | Refills: 11 | Status: SHIPPED | OUTPATIENT
Start: 2025-01-15 | End: 2026-01-15

## 2025-01-15 NOTE — PROCEDURES
Removal of Nexplanon Device    Date/Time: 1/15/2025 2:00 PM    Performed by: Lisandra You CNM  Authorized by: Lisandra You CNM    Consent obtained:  Prior to procedure the appropriate consent was completed and verified  Consent given by:  Patient  Procedure risks and benefits discussed: yes    Patient questions answered: yes    Patient agrees, verbalizes understanding, and wants to proceed: yes    Educational handouts given: yes    Instructions and paperwork completed: yes    Implant grasped by: hemostat  Removal due to infection and inflammatory reaction: no    Removal due to mechanical complications: no    Removed with no complications: yes    Removal due to expiration: yes    Arm: left arm  Palpation confirms location: yes  Small stab incision was made in arm: yes  Upon removal device was intact: yes  Site was close with steri-strips and pressure bandage applied: yes  Prepped with:  chlorhexidine gluconate  Local anesthetic:  Lidocaine 1%   The site was cleaned  and prepped in a sterile fashion: yes  Specimen sent to pathology: Yes

## 2025-01-15 NOTE — PROGRESS NOTES
Subjective     Patient ID: Bettye Jacobsen is a 24 y.o. female.    Chief Complaint: Contraception (In for Nexplanon removal. Denies any problems at this time.)    In for Nexplanon removal  Wants to start COCs  Requesting STD testing today    Review of Systems   Constitutional: Negative.    HENT: Negative.     Eyes: Negative.    Respiratory: Negative.     Cardiovascular: Negative.    Gastrointestinal: Negative.    Endocrine: Negative.    Genitourinary: Negative.  Negative for dysuria, frequency, menstrual irregularity, pelvic pain, urgency and vaginal discharge.   Musculoskeletal: Negative.    Integumentary:  Negative.   Allergic/Immunologic: Negative.    Neurological: Negative.    Psychiatric/Behavioral: Negative.       Past Medical History:   Diagnosis Date    ADHD (attention deficit hyperactivity disorder)     Anxiety     Dyspareunia     OCD (obsessive compulsive disorder)     Thyroid nodule     Has been seen at [facility] in the past for this     Past Surgical History:   Procedure Laterality Date    COLPOSCOPY        OB History    Para Term  AB Living   1 1 1     1   SAB IAB Ectopic Multiple Live Births           1      # Outcome Date GA Lbr Norman/2nd Weight Sex Type Anes PTL Lv   1 Term 11/10/21 39w6d  3.26 kg (7 lb 3 oz) M Vag-Spont EPI N PHILLIP      Current Outpatient Medications   Medication Instructions    dextroamphetamine-amphetamine 10 mg Tab 1 tablet, 3 times daily    drospirenone-ethinyl estradioL (MINA) 3-0.02 mg per tablet 1 tablet, Oral, Daily    fluvoxaMINE (LUVOX) 200 mg, Oral, Nightly    VYVANSE 50 mg, Oral         Objective   Vitals:    01/15/25 1514   BP: 108/62   Pulse: (!) 114     Wt Readings from Last 2 Encounters:   01/15/25 74.8 kg (165 lb)   24 74.8 kg (165 lb)      Physical Exam  Vitals reviewed.   Constitutional:       Appearance: Normal appearance.   HENT:      Head: Normocephalic.   Cardiovascular:      Rate and Rhythm: Normal rate.   Pulmonary:      Effort: Pulmonary effort is  normal.   Abdominal:      General: Abdomen is flat.      Palpations: Abdomen is soft.   Musculoskeletal:         General: Normal range of motion.      Cervical back: Normal range of motion.   Skin:     General: Skin is warm and dry.   Neurological:      Mental Status: She is alert and oriented to person, place, and time.   Psychiatric:         Mood and Affect: Mood normal.         Behavior: Behavior normal.        Assessment and Plan   1. Nexplanon removal  -     Removal of Nexplanon Device  -     Removal of Nexplanon Device    2. Screening for STDs (sexually transmitted diseases)  -     Trichomonas vaginalis by PCR  -     Chlamydia/GC, PCR  -     Treponema Pallidum (Syphillis) Antibody; Future; Expected date: 01/15/2025  -     Hepatitis B Surface Antigen; Future; Expected date: 01/15/2025  -     HIV 1/2 Ag/Ab (4th Gen); Future; Expected date: 01/15/2025    3. Encounter for initial prescription of contraceptive pills  -     drospirenone-ethinyl estradioL (MINA) 3-0.02 mg per tablet; Take 1 tablet by mouth once daily.  Dispense: 30 tablet; Refill: 11    Start oral contraception pills today, take every day.  Do not skip pills.  Discussed medication effects and poss side effects including irregular bleeding for first 3-4 months.  F/U as scheduled or for problems.       Follow up in about 1 year (around 1/15/2026) for Birth Control Follow Up, Annual Exam.

## 2025-01-21 ENCOUNTER — PATIENT MESSAGE (OUTPATIENT)
Dept: OBSTETRICS AND GYNECOLOGY | Facility: CLINIC | Age: 25
End: 2025-01-21
Payer: COMMERCIAL

## 2025-06-13 DIAGNOSIS — E04.1 THYROID NODULE: Primary | ICD-10-CM

## 2025-08-04 ENCOUNTER — OFFICE VISIT (OUTPATIENT)
Dept: SURGERY | Facility: CLINIC | Age: 25
End: 2025-08-04
Attending: SURGERY
Payer: COMMERCIAL

## 2025-08-04 ENCOUNTER — HOSPITAL ENCOUNTER (OUTPATIENT)
Dept: RADIOLOGY | Facility: HOSPITAL | Age: 25
Discharge: HOME | End: 2025-08-04
Attending: SURGERY
Payer: COMMERCIAL

## 2025-08-04 VITALS — HEIGHT: 68 IN | WEIGHT: 168 LBS | BODY MASS INDEX: 25.46 KG/M2

## 2025-08-04 DIAGNOSIS — E04.1 THYROID NODULE: ICD-10-CM

## 2025-08-04 DIAGNOSIS — E04.1 THYROID NODULE: Primary | ICD-10-CM

## 2025-08-04 PROCEDURE — 99213 OFFICE O/P EST LOW 20 MIN: CPT | Mod: PBBFAC,25 | Performed by: SURGERY

## 2025-08-04 PROCEDURE — 1159F MED LIST DOCD IN RCRD: CPT | Mod: CPTII,,, | Performed by: SURGERY

## 2025-08-04 PROCEDURE — 76536 US EXAM OF HEAD AND NECK: CPT | Mod: 26,,, | Performed by: INTERNAL MEDICINE

## 2025-08-04 PROCEDURE — 3008F BODY MASS INDEX DOCD: CPT | Mod: CPTII,,, | Performed by: SURGERY

## 2025-08-04 PROCEDURE — 99213 OFFICE O/P EST LOW 20 MIN: CPT | Mod: S$PBB,,, | Performed by: SURGERY

## 2025-08-04 PROCEDURE — 99999 PR PBB SHADOW E&M-EST. PATIENT-LVL III: CPT | Mod: PBBFAC,,, | Performed by: SURGERY

## 2025-08-04 PROCEDURE — 76536 US EXAM OF HEAD AND NECK: CPT | Mod: TC

## 2025-08-04 NOTE — PROGRESS NOTES
"                                                            General Surgery Progress Note    Subjective:      Patient ID: Bettye Jacobsen is a 24 y.o. female.    Chief Complaint: Follow-up (Yearly thyroid U/S)      HPI:  24-year-old female here for yearly follow up.  She has a 1.5 cm left-sided thyroid nodule previous FNA came back as a category 3 but genetic testing showed that has less than 1% chance of malignancy.  She complains that she feels it there kind of sticking out slightly on the left side but really denies any severe dysphagia, pain, severe swelling, or anything besides of the minimal symptoms.  No symptoms of hyper or hypothyroidism.    Past Medical History:   Diagnosis Date    ADHD (attention deficit hyperactivity disorder)     Anxiety     Dyspareunia     OCD (obsessive compulsive disorder)     Thyroid nodule     Has been seen at [facility] in the past for this     Past Surgical History:   Procedure Laterality Date    COLPOSCOPY       Social History[1]    Current Medications[2]  Review of patient's allergies indicates:   Allergen Reactions    Latex        Ht 5' 8" (1.727 m)   Wt 76.2 kg (168 lb)   BMI 25.54 kg/m²     Review of Systems   Constitutional: Negative for chills, fever, weight gain and weight loss.   Eyes:  Negative for blurred vision.   Cardiovascular:  Negative for chest pain, claudication and palpitations.   Respiratory:  Negative for cough and shortness of breath.    Musculoskeletal:  Negative for muscle weakness.   Gastrointestinal:  Negative for abdominal pain, diarrhea, nausea and vomiting.   Neurological:  Negative for numbness and paresthesias.         Objective:     Constitutional: Well, No apparent distress  Mental Status: Alert and oriented  x 3  Eyes: Normal sclera, normal eyelid  Neck: Trachea midline, no masses on neck exam  Respiratory: Clear to auscultation bilaterally with no wheezes/crackles/cough  Cardiac: Regular rate and rhythm, no murmur, rub, or gallops  Abdominal: Soft, " non-tender, non-distented  Hernia: No appreciated hernias  Musculoskeletal: 5/5 strength no weakess no decreased range of montion  Neurologic: Cranial nerves II - XII intact    Labs/ Imaging: CBC:   Lab Results   Component Value Date/Time    WBC 8.47 05/07/2021 11:20 PM    RBC 4.10 (L) 05/07/2021 11:20 PM    HGB 12.7 05/07/2021 11:20 PM    HCT 36.0 (L) 05/07/2021 11:20 PM     05/07/2021 11:20 PM    MCV 87.8 05/07/2021 11:20 PM    MCH 31.0 05/07/2021 11:20 PM    MCHC 35.3 05/07/2021 11:20 PM     BMP:   Lab Results   Component Value Date/Time    GLU 87 05/07/2021 11:20 PM    CO2 26 05/07/2021 11:20 PM    BUN 6 (L) 05/07/2021 11:20 PM    CREATININE 0.68 05/07/2021 11:20 PM    CALCIUM 9.0 05/07/2021 11:20 PM     CMP:   Lab Results   Component Value Date/Time    GLU 87 05/07/2021 11:20 PM    CALCIUM 9.0 05/07/2021 11:20 PM     05/07/2021 11:20 PM    K 3.8 05/07/2021 11:20 PM    CO2 26 05/07/2021 11:20 PM     05/07/2021 11:20 PM    BUN 6 (L) 05/07/2021 11:20 PM    CREATININE 0.68 05/07/2021 11:20 PM           Assessment:             1. Thyroid nodule          Plan:          No follow-ups on file.      Patient has a elected to just continue to watch it for now as well.  Her neck symptoms are very minimal not getting any worse and she will just continue to follow up.  If it does have any worsening swelling pain and discomfort she would consider a dressing it.  Otherwise she will come back in a year with a repeat ultrasound.  All questions were answered.       [1]   Social History  Socioeconomic History    Marital status: Single   Tobacco Use    Smoking status: Every Day     Types: Cigarettes    Smokeless tobacco: Never   Substance and Sexual Activity    Alcohol use: Never    Drug use: Never    Sexual activity: Not Currently     Partners: Male     Birth control/protection: Implant   [2]   Current Outpatient Medications:     dextroamphetamine-amphetamine 10 mg Tab, Take 1 tablet by mouth 3 (three) times  daily., Disp: , Rfl:     fluvoxaMINE (LUVOX) 100 MG tablet, Take 200 mg by mouth every evening., Disp: , Rfl:     VYVANSE 50 mg capsule, Take 50 mg by mouth., Disp: , Rfl:     drospirenone-ethinyl estradioL (MINA) 3-0.02 mg per tablet, Take 1 tablet by mouth once daily. (Patient not taking: Reported on 8/4/2025), Disp: 30 tablet, Rfl: 11